# Patient Record
Sex: FEMALE | Race: WHITE
[De-identification: names, ages, dates, MRNs, and addresses within clinical notes are randomized per-mention and may not be internally consistent; named-entity substitution may affect disease eponyms.]

---

## 2019-06-04 ENCOUNTER — HOSPITAL ENCOUNTER (OUTPATIENT)
Dept: HOSPITAL 5 - ED | Age: 79
Setting detail: OBSERVATION
LOS: 1 days | Discharge: HOME | End: 2019-06-05
Attending: INTERNAL MEDICINE | Admitting: INTERNAL MEDICINE
Payer: MEDICARE

## 2019-06-04 DIAGNOSIS — R01.1: ICD-10-CM

## 2019-06-04 DIAGNOSIS — I10: ICD-10-CM

## 2019-06-04 DIAGNOSIS — I16.0: ICD-10-CM

## 2019-06-04 DIAGNOSIS — N17.9: ICD-10-CM

## 2019-06-04 DIAGNOSIS — R79.89: ICD-10-CM

## 2019-06-04 DIAGNOSIS — R07.89: ICD-10-CM

## 2019-06-04 DIAGNOSIS — I24.9: ICD-10-CM

## 2019-06-04 DIAGNOSIS — R07.9: Primary | ICD-10-CM

## 2019-06-04 LAB
BASOPHILS # (AUTO): 0.1 K/MM3 (ref 0–0.1)
BASOPHILS NFR BLD AUTO: 1.3 % (ref 0–1.8)
BUN SERPL-MCNC: 20 MG/DL (ref 7–17)
BUN/CREAT SERPL: 25 %
CALCIUM SERPL-MCNC: 9.1 MG/DL (ref 8.4–10.2)
EOSINOPHIL # BLD AUTO: 0.1 K/MM3 (ref 0–0.4)
EOSINOPHIL NFR BLD AUTO: 1.9 % (ref 0–4.3)
HCT VFR BLD CALC: 37.9 % (ref 30.3–42.9)
HDLC SERPL-MCNC: 57 MG/DL (ref 40–59)
HEMOLYSIS INDEX: 14
HGB BLD-MCNC: 12.7 GM/DL (ref 10.1–14.3)
LYMPHOCYTES # BLD AUTO: 2.3 K/MM3 (ref 1.2–5.4)
LYMPHOCYTES NFR BLD AUTO: 37 % (ref 13.4–35)
MCHC RBC AUTO-ENTMCNC: 34 % (ref 30–34)
MCV RBC AUTO: 88 FL (ref 79–97)
MONOCYTES # (AUTO): 0.6 K/MM3 (ref 0–0.8)
MONOCYTES % (AUTO): 9.4 % (ref 0–7.3)
PLATELET # BLD: 245 K/MM3 (ref 140–440)
RBC # BLD AUTO: 4.3 M/MM3 (ref 3.65–5.03)

## 2019-06-04 PROCEDURE — G0378 HOSPITAL OBSERVATION PER HR: HCPCS

## 2019-06-04 PROCEDURE — 93017 CV STRESS TEST TRACING ONLY: CPT

## 2019-06-04 PROCEDURE — 93005 ELECTROCARDIOGRAM TRACING: CPT

## 2019-06-04 PROCEDURE — 83036 HEMOGLOBIN GLYCOSYLATED A1C: CPT

## 2019-06-04 PROCEDURE — 71045 X-RAY EXAM CHEST 1 VIEW: CPT

## 2019-06-04 PROCEDURE — 80061 LIPID PANEL: CPT

## 2019-06-04 PROCEDURE — 93010 ELECTROCARDIOGRAM REPORT: CPT

## 2019-06-04 PROCEDURE — 84443 ASSAY THYROID STIM HORMONE: CPT

## 2019-06-04 PROCEDURE — 96372 THER/PROPH/DIAG INJ SC/IM: CPT

## 2019-06-04 PROCEDURE — 85025 COMPLETE CBC W/AUTO DIFF WBC: CPT

## 2019-06-04 PROCEDURE — 36415 COLL VENOUS BLD VENIPUNCTURE: CPT

## 2019-06-04 PROCEDURE — 96374 THER/PROPH/DIAG INJ IV PUSH: CPT

## 2019-06-04 PROCEDURE — 71275 CT ANGIOGRAPHY CHEST: CPT

## 2019-06-04 PROCEDURE — 78452 HT MUSCLE IMAGE SPECT MULT: CPT

## 2019-06-04 PROCEDURE — 93306 TTE W/DOPPLER COMPLETE: CPT

## 2019-06-04 PROCEDURE — 80048 BASIC METABOLIC PNL TOTAL CA: CPT

## 2019-06-04 PROCEDURE — 84484 ASSAY OF TROPONIN QUANT: CPT

## 2019-06-04 PROCEDURE — 96375 TX/PRO/DX INJ NEW DRUG ADDON: CPT

## 2019-06-04 PROCEDURE — 70450 CT HEAD/BRAIN W/O DYE: CPT

## 2019-06-04 PROCEDURE — 99284 EMERGENCY DEPT VISIT MOD MDM: CPT

## 2019-06-04 PROCEDURE — 83735 ASSAY OF MAGNESIUM: CPT

## 2019-06-04 PROCEDURE — A9502 TC99M TETROFOSMIN: HCPCS

## 2019-06-04 PROCEDURE — 82550 ASSAY OF CK (CPK): CPT

## 2019-06-04 RX ADMIN — Medication SCH ML: at 21:50

## 2019-06-04 NOTE — CAT SCAN REPORT
PROCEDURE: CT ANGIO CHEST 

 

TECHNIQUE:  Computerized tomographic angiography of the chest was performed after the IV injection of
 iodinated nonionic contrast including image processing.  The image data was postprocessed using 2-di
mensional multiplanar reformatted (MPR) and 3-dimensional (MIP and/or volume rendered) techniques. Au
tomated exposure control, adjustment of mA and/or kV according to patient size, or iterative reconstr
uction dose optimization techniques were utilized.  

 

CONTRAST: 100 cc Omnipaque 350 given IV 

 

HISTORY: cp sob 

 

COMPARISONS:  None . 

 

FINDINGS: 

 

Heart and pericardium: Normal. 

Thoracic aorta:  Atherosclerotic calcification of the arch of aorta is seen. 

Pulmonary vasculature: Normal. No evidence for PE 

Lymph nodes:  No enlarged thoracic lymph nodes. 

Lungs:  Normal. 

Pleural space:  No effusion, thickening, or pneumothorax. 

Musculoskeletal structures:  Mild degenerative disc changes are present in the lower thoracic spine. 


Upper abdominal structures:  No significant abnormality. 

 

IMPRESSION: No evidence for PE. No acute abnormality. 

 

This document is electronically signed by Roberta Mercedes MD., June 4 2019 08:25:13 PM ET

## 2019-06-04 NOTE — HISTORY AND PHYSICAL REPORT
History of Present Illness


Date of examination: 06/04/19


Date of admission: 


06/04/2019


Chief complaint: 





Chest pain


History of present illness: 





78-year-old Mong[place]ian older adult female with history of hypertension presents 

to [facility] ED as a referral from her cardiologist Dr. Fox ([facility]) 

with complaints of chest pain, dyspnea, and palpitations for one week.  She has 

requested that her daughter translates.  Patient states that approximately one 

week ago she started experiencing intermittent chest pain, palpitations, and 

dyspnea.  She went to her PCP and was referred to [facility] for 

further evaluation.  She describes her pain as "pressure", and rates it 6/10.  

The pain is aggravated with activity and relieved with rest.  Occasionally her 

pain is associated with frontal headache. 





Denies cough, dyspnea at rest, hemoptysis, visual disturbances, gait 

abnormalities, recent travel, recent sick exposure.





Past History


Past Medical History: hypertension


Past Surgical History: appendectomy


Social history: Lives alone


Family history: no significant family history





Medications and Allergies


                                    Allergies











Allergy/AdvReac Type Severity Reaction Status Date / Time


 


No Known Allergies Allergy   Verified 06/04/19 20:40











Active Meds: 


Active Medications





Acetaminophen (Tylenol)  650 mg PO Q4H PRN


   PRN Reason: Pain MILD(1-3)/Fever >100.5/HA


Aspirin (Baby Aspirin)  81 mg PO QDAY [facility]


Atorvastatin Calcium (Lipitor)  20 mg PO QHS [facility]


Clopidogrel Bisulfate (Plavix)  75 mg PO QDAY [facility]


Heparin Sodium (Porcine) (Heparin)  5,000 unit SUB-Q Q12HR [facility]


Hydralazine HCl (Apresoline)  10 mg IV Q4HR PRN


   PRN Reason: Blood Pressure


Sodium Chloride (Nacl 0.9% 1000 Ml)  1,000 mls @ 75 mls/hr IV AS DIRECT MARCI


   Stop: 06/05/19 11:00


Losartan Potassium (Cozaar)  25 mg PO QDAY [facility]


Morphine Sulfate (Morphine)  2 mg IV Q4H PRN


   PRN Reason: Pain, Moderate (4-6)


   Stop: 06/05/19 23:59


Nitroglycerin (Nitrostat)  0.4 mg SL Q5M PRN


   PRN Reason: Chest Pain


Ondansetron HCl (Zofran)  4 mg IV Q8H PRN


   PRN Reason: Nausea And Vomiting


Sodium Chloride (Sodium Chloride Flush Syringe 10 Ml)  10 ml IV BID MARCI


Sodium Chloride (Sodium Chloride Flush Syringe 10 Ml)  10 ml IV PRN PRN


   PRN Reason: LINE FLUSH











Review of Systems


All systems: negative (reviewed and no additional remarkable complaints except 

as noted below)


Ears, nose, mouth and throat: other (wears dentures)


Cardiovascular: chest pain, palpitations, shortness of breath


Neurological: headaches (frontal headache accompanied by chest pain)





Exam





- Physical Exam


Narrative exam: 





Physical exam





General appearance: Present: No acute distress, alert and oriented 3, 

Mongolian speaking in older adult female





- EENT


Eyes: Present: PERRL, EOM intact


ENT: hearing intact, dentures present





- Neck


Neck: Present: supple, normal ROM





- Respiratory


Respiratory effort: Non-labored


Respiratory: bilateral: diminished (bases)





- Cardiovascular


Heart rate:  85 (bpm)


Rhythm: Sinus rhythm, incomplete right bundle-branch block


Heart Sounds: Present: S1 & S2.  Absent: rub, click





- Extremities


Extremities: no ischemia, pulses intact, abnormal (bilateral lower extremity 

pitting edema R>L)








- Peripheral Assessment


Peripheral Pulses: within normal limits


 


- Abdominal


General gastrointestinal: soft, non-tender, normal bowel sounds





- Integumentary


Integumentary: Present: warm, dry





- Musculoskeletal


Musculoskeletal: generalized weakness





- Psychiatric


Psychiatric: cooperative





- Constitutional


Vitals: 


                                        











Temp Pulse Resp BP Pulse Ox


 


 98.3 F   77   13   169/79   96 


 


 06/04/19 18:09  06/04/19 19:41  06/04/19 19:41  06/04/19 19:41  06/04/19 19:41














Results





- Labs


CBC & Chem 7: 


                                 06/04/19 16:43





                                 06/04/19 16:43


Labs: 


                             Laboratory Last Values











WBC  6.3 K/mm3 (4.5-11.0)   06/04/19  16:43    


 


RBC  4.30 M/mm3 (3.65-5.03)   06/04/19  16:43    


 


Hgb  12.7 gm/dl (10.1-14.3)   06/04/19  16:43    


 


Hct  37.9 % (30.3-42.9)   06/04/19  16:43    


 


MCV  88 fl (79-97)   06/04/19  16:43    


 


MCH  30 pg (28-32)   06/04/19  16:43    


 


MCHC  34 % (30-34)   06/04/19  16:43    


 


RDW  14.8 % (13.2-15.2)   06/04/19  16:43    


 


Plt Count  245 K/mm3 (140-440)   06/04/19  16:43    


 


Lymph % (Auto)  37.0 % (13.4-35.0)  H  06/04/19  16:43    


 


Mono % (Auto)  9.4 % (0.0-7.3)  H  06/04/19  16:43    


 


Eos % (Auto)  1.9 % (0.0-4.3)   06/04/19  16:43    


 


Baso % (Auto)  1.3 % (0.0-1.8)   06/04/19  16:43    


 


Lymph #  2.3 K/mm3 (1.2-5.4)   06/04/19  16:43    


 


Mono #  0.6 K/mm3 (0.0-0.8)   06/04/19  16:43    


 


Eos #  0.1 K/mm3 (0.0-0.4)   06/04/19  16:43    


 


Baso #  0.1 K/mm3 (0.0-0.1)   06/04/19  16:43    


 


Seg Neutrophils %  50.4 % (40.0-70.0)   06/04/19  16:43    


 


Seg Neutrophils #  3.2 K/mm3 (1.8-7.7)   06/04/19  16:43    


 


Sodium  137 mmol/L (137-145)   06/04/19  16:43    


 


Potassium  4.5 mmol/L (3.6-5.0)   06/04/19  16:43    


 


Chloride  100.8 mmol/L ()   06/04/19  16:43    


 


Carbon Dioxide  26 mmol/L (22-30)   06/04/19  16:43    


 


  15 mmol/L  06/04/19  16:43    


 


BUN  20 mg/dL (7-17)  H  06/04/19  16:43    


 


  0.8 mg/dL (0.7-1.2)   06/04/19  16:43    


 


Estimated GFR  > 60 ml/min  06/04/19  16:43    


 


  25 %  06/04/19  16:43    


 


Glucose  101 mg/dL ()  H  06/04/19  16:43    


 


Calcium  9.1 mg/dL (8.4-10.2)   06/04/19  16:43    


 


Magnesium  1.90 mg/dL (1.7-2.3)   06/04/19  19:12    


 


  84 units/L ()   06/04/19  19:12    


 


  < 0.010 ng/mL (0.00-0.029)   06/04/19  19:12    


 


TSH  0.398 mlU/mL (0.270-4.200)   06/04/19  19:12    














- Imaging and Cardiology


EKG: image reviewed (sinus rhythm 85 bpm, incomplete right bundle cody block)


Chest x-ray: report reviewed ( No radiographic evidence of acute cardiopulmonary

abnormality.), image reviewed


CT scan - chest: report reviewed (No CT evidence of intracranial mass, 

hemorrhage, acute territorial infarction, or hydrocephalus.There is patchy 

bilateral white matter low-attenuation, compatible with chronic microvascular 

ischemic changes. The intracranial arteries are symmetric in density. Calvarium 

is intact. Paranasal sinuses and mastoids are aerated.), image reviewed


Imaging and Cardiology: 





CTA Chest: No evidence for PE. No acute abnormality.





Assessment and Plan


Assessment and plan: 





78-year-old [place] older adult female with history of hypertension presents 

to [facility] ED as a referral from her cardiologist Dr. Fox ([facility]) 

with complaints of chest pain, dyspnea, and palpitations.  EKG did not show any 

acute ischemic abnormalities.  Troponin negative 2.  CTA chest unrevealing for 

PE.  CT head did not reveal any intracranial mass, hemorrhage, acute infarction,

or hydrocephalus.  She is found to be in hypertensive urgency with blood 

pressure of 185/100. She will be admitted to telemetry unit.  Dr. Fox 

consulted.





Acute chest pain


R/O ACS


Hypertensive urgency


Hx of HTN


GERALDO








Plan:


Continue supportive care


Continuous telemetry monitoring 


Pain management


Nothing by mouth at midnight; low-sodium diet when diet is resumed


Echo and Lexiscan pending for am


Cardiology consult and following


Monitor BP


Losartan 50mg daily, IV hydralazine when necessary


Monitor renal function


Gentle hydration with IVF; if no improvement will consider consult to nephrology


Start ASA, Statin


DVT PPX heparin and SCD's








Advance Directives: No


VTE prophylaxis?: Mechanical


Plan of care discussed with patient/family: Yes

## 2019-06-04 NOTE — EMERGENCY DEPARTMENT REPORT
ED Chest Pain HPI





- General


Chief Complaint: Chest Pain


Stated Complaint: CHEST PAIN/FATIGUE


Time Seen by Provider: 06/04/19 16:28


Source: patient, family, , RN notes reviewed


Mode of arrival: Ambulatory


Limitations: Language Barrier





- History of Present Illness


Initial Comments: 





This is an 78-year-old female.  The patient is not known to this provider 

previously.





Her primary care doctor is Dr. Lázaro Kidd.





Her cardiologist of record is Dr. POLO Fox





The patient reportedly has a history of hypertension.





The patient is accompanied by her daughter.  The patient requests that her 

daughter translate for her.  The patient presents to the emergency room after 

referral from her cardiologist.  The patient presents to the emergency room t

bairon with primary complaint of shortness of breath, malaise, fatigue, 

intermittent sensation of heart racing, and chest pressure.  The symptoms are 

present for the past week or so.  They're intermittent.  They do not radiate 

anywhere.  There is no exacerbating or relieving factors.  The patient endorses 

frontal headache, present for one month, not sudden or thunderclap in nature.  

The patient endorses no temporal pain, no change in vision, and no jaw 

claudication or pain with chewing food.  She has dentures in place, and endorses

that her dentures occasionally irritate her gums.





She cannot clarify what she means by short of breath.  However, she indicates 

that her breathing feels off.  She denies abdominal pain.  She denies urinary 

symptoms.  She denies extremity weakness.  She denies fever.  No recent cardiac 

risk stratification.  No recent aspirin consumption.  Patient currently taking 

losartan, 100 mg by mouth daily


MD Complaint: chest pain, other


-: Gradual, week(s)


Onset: during rest


Pain Location: substernal, left chest, right chest


Severity: mild


Quality: other


Consistency: intermittent


Improves With: nothing


Worsens With: nothing


Aspirin use within the Past 7 Days: (0) No





- Related Data


                                    Allergies











Allergy/AdvReac Type Severity Reaction Status Date / Time


 


No Known Allergies Allergy   Verified 06/04/19 20:40














Heart Score





- HEART Score


History: Slightly suspicious


EKG: Non-specific


Age: > 65


Risk factors: 1-2 risk factors


Troponin: < normal limit


HEART Score: 4





- Critical Actions


Critical Actions: 4-6 pts:12-16.6% risk of adverse cardiac event. Should be 

admitted





ED Review of Systems


ROS: 


Stated complaint: CHEST PAIN/FATIGUE


Other details as noted in HPI





Constitutional: malaise.  denies: fever


ENT: denies: epistaxis


Respiratory: shortness of breath


Cardiovascular: chest pain, palpitations.  denies: edema


Gastrointestinal: denies: abdominal pain


Genitourinary: denies: dysuria


Musculoskeletal: denies: back pain


Neurological: headache.  denies: weakness





ED Past Medical Hx





- Past Medical History


Hx Hypertension: Yes





- Surgical History


Hx Appendectomy: Yes





- Social History


Smoking Status: Never Smoker


Substance Use Type: None





ED Physical Exam





- General


Limitations: Language Barrier


General appearance: alert, in no apparent distress





- Head


Head exam: Present: atraumatic, normocephalic





- Eye


Eye exam: Present: normal appearance, EOMI.  Absent: nystagmus





- ENT


ENT exam: Present: normal exam, normal orophraynx, mucous membranes moist, 

normal external ear exam, other (there is no temporal tenderness.  There is no 

stridor.  There is no claudication noted.)





- Neck


Neck exam: Present: normal inspection, full ROM.  Absent: tenderness, 

meningismus





- Respiratory


Respiratory exam: Present: normal lung sounds bilaterally.  Absent: respiratory 

distress





- Cardiovascular


Cardiovascular Exam: Present: regular rate, normal rhythm, normal heart sounds. 

Absent: bradycardia, tachycardia, irregular rhythm, systolic murmur, diastolic 

murmur, rubs, gallop





- GI/Abdominal


GI/Abdominal exam: Present: soft.  Absent: distended, tenderness, guarding, 

rebound, rigid, pulsatile mass





- Extremities Exam


Extremities exam: Present: normal inspection, full ROM, pedal edema, other (2+ 

pulses noted in the bilateral upper, lower extremities.  Compartments soft.  No 

long bony tenderness.  The pelvis is stable.).  Absent: calf tenderness





- Back Exam


Back exam: Present: normal inspection, full ROM.  Absent: tenderness, CVA 

tenderness (R), CVA tenderness (L), paraspinal tenderness, vertebral tenderness





- Neurological Exam


Neurological exam: Present: alert, other (there is no facial.  Tongue is 

midline.  Extraocular movements are intact bilaterally.  5 out of 5 strength in 

4 extremities.)





- Psychiatric


Psychiatric exam: Present: normal affect, normal mood





- Skin


Skin exam: Present: warm, dry, intact, normal color.  Absent: rash





ED Course


                                   Vital Signs











  06/04/19 06/04/19 06/04/19





  16:29 18:09 19:41


 


Temperature 98.9 F 98.3 F 


 


Pulse Rate 84 80 77


 


Respiratory 16 17 13





Rate   


 


Blood Pressure 185/100  


 


Blood Pressure  180/91 169/79





[Right]   


 


O2 Sat by Pulse 96 97 96





Oximetry   














- Reevaluation(s)


Reevaluation #1: 





06/04/19 17:58


Differential diagnosis, including not limited to: Deconditioning, GERD, 

gastritis, hiatal hernia, pneumonia, acute coronary syndrome, congestive heart 

failure, pulmonary hypertension, migraine headache, tension headache, headache, 

intracranial mass lesion, dependent edema








Assessment and plan: 78-year-old female sent to the emergency room by her 

cardiologist.  She has atypical symptoms, including nonspecific nonexertional 

shortness of breath, fatigue, intermittent chest pressure, and chest racing.  

She has no pulmonary embolus or DVT risk factors and she is low risk by well's 

criteria.  Nevertheless, her EKG has nonspecific abnormalities, and given 

advanced age, the patient is moderate risk for major adverse cardiac event, as 

for the heart risk stratification tool.  Therefore, we have recommended 

admission to the hospital for an expedient cardiac risk stratification.  We will

 treat her symptoms.  Lower extremity edema is asymptomatic and not noticed by 

the patient or family, this is likely dependent edema.  Does not have crackles 

or rales or significant JVD, do not suspect right-sided heart failure at this 

time.





We will obtain CT scan of the chest to exclude pulmonary embolus and atypical 

presentation of dissection, although we doubts dissection based off of x-ray, 

and equal pulse examination.





Endorse a secondary complaint of frontal headache, present 1 month, with no red

 flag symptoms or historical modifiers.  Headache by history does not appear to 

be consistent with subarachnoid hemorrhage or ischemic or hemorrhagic stroke, 

however, given advanced age, we will obtain noncontrast CT scan of the brain.  

No symptoms at this point time to suggest claudication or temporal arteritis.  

Discussed plan of care for admission and symptomatic therapy with patient and 

family, who verbalized understanding, and are amenable to this plan of care.  

Case was discussed with covering cardiologist, Dr. ROD Rodríguez who recommends 

nothing by mouth after midnight, and Lexiscan stress test.


Reevaluation #2: 





06/04/19 19:39


CT head negative.  Patient resting comfortably in stretcher, and in no acute 

distress.  Aspirin ordered





 BRIAN Diaz to admit patient to the medical service for cardiac risk stra

tification.


Reevaluation #3: 





06/04/19 20:54


CTA chest negative for acute disease.





GABINO score





- Gabino Score


Age > 65: (1) Yes


Aspirin use within the Past 7 Days: (0) No


3 or more CAD Risk Factors: (0) No


2 or more Angina events in past 24 hrs: (1) Yes


Known CAD with more than 50% Stenosis: (0) No


Elevated Cardiac Markers: (0) No


ST Deviation Greater than 0.5mm: (0) No


GABINO Score: 2





ED Medical Decision Making





- Lab Data


Result diagrams: 


                                 06/04/19 16:43





                                 06/04/19 16:43








                                   Vital Signs











  06/04/19





  16:29


 


Temperature 98.9 F


 


Pulse Rate 84


 


Respiratory 16





Rate 


 


Blood Pressure 185/100


 


O2 Sat by Pulse 96





Oximetry 











                                   Lab Results











  06/04/19 06/04/19 Range/Units





  16:43 16:43 


 


WBC  6.3   (4.5-11.0)  K/mm3


 


RBC  4.30   (3.65-5.03)  M/mm3


 


Hgb  12.7   (10.1-14.3)  gm/dl


 


Hct  37.9   (30.3-42.9)  %


 


MCV  88   (79-97)  fl


 


MCH  30   (28-32)  pg


 


MCHC  34   (30-34)  %


 


RDW  14.8   (13.2-15.2)  %


 


Plt Count  245   (140-440)  K/mm3


 


Lymph % (Auto)  37.0 H   (13.4-35.0)  %


 


Mono % (Auto)  9.4 H   (0.0-7.3)  %


 


Eos % (Auto)  1.9   (0.0-4.3)  %


 


Baso % (Auto)  1.3   (0.0-1.8)  %


 


Lymph #  2.3   (1.2-5.4)  K/mm3


 


Mono #  0.6   (0.0-0.8)  K/mm3


 


Eos #  0.1   (0.0-0.4)  K/mm3


 


Baso #  0.1   (0.0-0.1)  K/mm3


 


Seg Neutrophils %  50.4   (40.0-70.0)  %


 


Seg Neutrophils #  3.2   (1.8-7.7)  K/mm3


 


Sodium   137  (137-145)  mmol/L


 


Potassium   4.5  (3.6-5.0)  mmol/L


 


Chloride   100.8  ()  mmol/L


 


Carbon Dioxide   26  (22-30)  mmol/L


 


Anion Gap   15  mmol/L


 


BUN   20 H  (7-17)  mg/dL


 


Creatinine   0.8  (0.7-1.2)  mg/dL


 


Estimated GFR   > 60  ml/min


 


BUN/Creatinine Ratio   25  %


 


Glucose   101 H  ()  mg/dL


 


Calcium   9.1  (8.4-10.2)  mg/dL


 


Troponin T   < 0.010  (0.00-0.029)  ng/mL














- EKG Data


-: EKG Interpreted by Me





- EKG Data





06/04/19 17:57


EKG today shows a sinus rhythm, 85 bpm, QTC prolonged, incomplete right bundle 

branch block, abnormal EKG, not consistent with ST elevation myocardial 

infarction, appears to be grossly unchanged when compared to prior EKG from 

cardiology office.











- Radiology Data


Radiology results: report reviewed, image reviewed


interpreted by me: 





X-ray of the chest is negative for acute disease


Critical care attestation.: 


If time is entered above; I have spent that time in minutes in the direct care 

of this critically ill patient, excluding procedure time.








ED Disposition


Clinical Impression: 


 Chest pain, Fatigue, Dyspnea, Headache





Disposition: DC-09 OP ADMIT IP TO THIS HOSP


Is pt being admited?: Yes


Condition: Good


Instructions:  Chest Pain (ED)

## 2019-06-04 NOTE — XRAY REPORT
PROCEDURE: XR CHEST 1V AP 

 

TECHNIQUE:  Chest radiograph single view.  

 

HISTORY: Chest Pain 

 

COMPARISONS: None . 

 

FINDINGS: 

 

Heart: Normal. 

Mediastinum/Vessels: Normal. 

Lungs/Pleural space:  No infiltrate, effusion, or pneumothorax. 

Bony thorax: Left shoulder intra-articular bodies. 

Life support devices: None. 

 

IMPRESSION:  No radiographic evidence of acute cardiopulmonary abnormality. 

 

This document is electronically signed by Crystal Broderick MD., June 4 2019 05:18:48 PM ET

## 2019-06-04 NOTE — EMERGENCY DEPARTMENT REPORT
-- Message is from the [facility]--    Reason for Call: PATIENTS WIFE IS CALLING BECAUSE [facility] WILL NOT APPROVE dulaglutide (TRULICITY) 1.5 MG/0.5ML pen-injector WITHOUT DOCTOR CALLING INSURANCE COMPANY FOR APPROVAL.  PATIENT HAS BEEN WITHOUT FOR 3 MONTHS      Alternative phone number: N/A    Turnaround time given to caller:   \"This message will be sent to [state Provider's name]. The clinical team will fulfill your request as soon as they review your message.\"     Chief Complaint: Chest Pain


Stated Complaint: CHEST PAIN/FATIGUE


Time Seen by Provider: 06/04/19 16:28





- HPI


History of Present Illness: 





This is a 78 y.o. F. that presents to the ER with chest pain.  





Patient was sent from [facility] Dr. Fox for abnormal EKG, atrial 

flutter.





[facility] HTN





- Exam


Vital Signs: 


                                   Vital Signs











  06/04/19





  16:29


 


Temperature 98.9 F


 


Pulse Rate 84


 


Respiratory 16





Rate 


 


Blood Pressure 185/100


 


O2 Sat by Pulse 96





Oximetry 











MSE screening note: 


Focused history and physical exam performed.


Due to findings the following was ordered:





Cardiac workup





Main ED





ED Disposition for MSE


Condition: Stable

## 2019-06-05 VITALS — DIASTOLIC BLOOD PRESSURE: 71 MMHG | SYSTOLIC BLOOD PRESSURE: 147 MMHG

## 2019-06-05 LAB
BASOPHILS # (AUTO): 0.1 K/MM3 (ref 0–0.1)
BASOPHILS NFR BLD AUTO: 1 % (ref 0–1.8)
BUN SERPL-MCNC: 12 MG/DL (ref 7–17)
BUN/CREAT SERPL: 15 %
CALCIUM SERPL-MCNC: 9.2 MG/DL (ref 8.4–10.2)
EOSINOPHIL # BLD AUTO: 0.1 K/MM3 (ref 0–0.4)
EOSINOPHIL NFR BLD AUTO: 2.1 % (ref 0–4.3)
HCT VFR BLD CALC: 38.9 % (ref 30.3–42.9)
HEMOLYSIS INDEX: 10
HGB BLD-MCNC: 13.2 GM/DL (ref 10.1–14.3)
LYMPHOCYTES # BLD AUTO: 1.8 K/MM3 (ref 1.2–5.4)
LYMPHOCYTES NFR BLD AUTO: 26.8 % (ref 13.4–35)
MCHC RBC AUTO-ENTMCNC: 34 % (ref 30–34)
MCV RBC AUTO: 87 FL (ref 79–97)
MONOCYTES # (AUTO): 0.6 K/MM3 (ref 0–0.8)
MONOCYTES % (AUTO): 8.5 % (ref 0–7.3)
PLATELET # BLD: 241 K/MM3 (ref 140–440)
RBC # BLD AUTO: 4.48 M/MM3 (ref 3.65–5.03)

## 2019-06-05 RX ADMIN — HEPARIN SODIUM SCH: 5000 INJECTION, SOLUTION INTRAVENOUS; SUBCUTANEOUS at 11:55

## 2019-06-05 RX ADMIN — Medication SCH ML: at 11:40

## 2019-06-05 RX ADMIN — HEPARIN SODIUM SCH UNIT: 5000 INJECTION, SOLUTION INTRAVENOUS; SUBCUTANEOUS at 01:49

## 2019-06-05 NOTE — TREADMILL REPORT
STRESS TEST



INDICATION:  Chest pain.



ORDERING PHYSICIAN:  Camden Bergeron MD



FINDINGS:  There is no scintigraphic evidence of myocardial ischemia.  The left

ventricle is normal in size.  The left ventricular systolic function is measured

at 80%.  There is normal wall motion and wall thickening on gated imaging.



CONCLUSION:  Normal perfusion scan.





DD: 06/05/2019 11:32

DT: 06/05/2019 14:01

JOB# [id number]  [id number]

AKJEB/NTS

## 2019-06-05 NOTE — DISCHARGE SUMMARY
Providers





- Providers


Date of Admission: 


06/04/19 20:28





Date of discharge: 06/05/19


Attending physician: 


REGAN WELSH





                                        





06/04/19 17:32


Consult to Physician [CONS] Urgent 


   Comment: 


   Consulting Provider: DARRIN LEBRON


   Physician Instructions: 


   Reason For Exam: cp sent by your group











Primary care physician: 


BIRGIT KIDD








Hospitalization


Condition: Good


Hospital course: 


 Patient is 78 yo with hypertension presented with chest pain, palpitation.  She

 was seen and evaluated in the emergency department.  Plan was normal.  Patient 

was admitted to rule out acute coronary syndrome.  Stress test done on the 

following day was negative.  Chest pain determined to be noncardiac due to GERD.

  Patient subsequently discharged home to follow as an outpatient.





Disposition: DC-01 TO HOME OR SELFCARE





- Discharge Diagnoses


(1) GERD (gastroesophageal reflux disease)


Status: Acute   





(2) Chest pain


Status: Acute   





Core Measure Documentation





- Palliative Care


Palliative Care/ Comfort Measures: Not Applicable





- Core Measures


Any of the following diagnoses?: none





Exam





- Constitutional


Vitals: 


                                        











Temp Pulse Resp BP Pulse Ox


 


 97.6 F   109 H  18   169/92   96 


 


 06/05/19 04:59  06/05/19 11:37  06/05/19 04:58  06/05/19 11:37  06/05/19 04:58














Plan


Activity: no restrictions


Diet: low fat, low cholesterol, low salt


Additional Instructions: 1.Follow up with Dr. Birgit Kidd, PCP in 1 week.  

2.Follow up with Dr. Lebron, cardiology in 1 week.


Follow up with: 


MORIAH CONNELL MD [Staff Physician] - 3-5 Days


Prescriptions: 


amLODIPine [Norvasc] 5 mg PO DAILY #30 tab


Famotidine [Pepcid] 20 mg PO BID #60 tablet

## 2019-06-05 NOTE — EVENT NOTE
Date: 06/05/19





Atypical chest pain


   Normal lexiscan MPI


   Normal LVEF


   No PE or aortic pathology noted on CTA chest


Systemic Hypertension - uncontrolled


   On losartan





Recommendations:





No further cardiac work-up is needed


Titrate BP meds as needed


Outpatient follow-up

## 2025-07-20 NOTE — CAT SCAN REPORT
PROCEDURE:  CT HEAD/BRAIN WO CON 

 

TECHNIQUE:  Computerized tomography of the head was performed without contrast material.  

 

CT DOSE LENGTH PRODUCT:  920.5  mGycm 

 

HISTORY: rojas 

 

COMPARISONS:  None . 

 

FINDINGS: 

 

No CT evidence of intracranial mass, hemorrhage, acute territorial infarction, or hydrocephalus. Ther
e is patchy bilateral white matter low-attenuation, compatible with chronic microvascular ischemic ch
anges. The intracranial arteries are symmetric in density. Calvarium is intact. Paranasal sinuses and
 mastoids are aerated. 

 

IMPRESSION:   

 

No CT evidence of acute abnormality . 

 

This document is electronically signed by Crystal Broderick MD., June 4 2019 07:12:42 PM ET show